# Patient Record
Sex: MALE
[De-identification: names, ages, dates, MRNs, and addresses within clinical notes are randomized per-mention and may not be internally consistent; named-entity substitution may affect disease eponyms.]

---

## 2019-07-16 ENCOUNTER — HOSPITAL ENCOUNTER (INPATIENT)
Dept: HOSPITAL 88 - ER | Age: 79
LOS: 2 days | Discharge: HOME | DRG: 690 | End: 2019-07-18
Attending: INTERNAL MEDICINE | Admitting: INTERNAL MEDICINE
Payer: COMMERCIAL

## 2019-07-16 VITALS — BODY MASS INDEX: 33.74 KG/M2 | WEIGHT: 215 LBS | HEIGHT: 67 IN

## 2019-07-16 VITALS — SYSTOLIC BLOOD PRESSURE: 158 MMHG | DIASTOLIC BLOOD PRESSURE: 79 MMHG

## 2019-07-16 VITALS — DIASTOLIC BLOOD PRESSURE: 78 MMHG | SYSTOLIC BLOOD PRESSURE: 149 MMHG

## 2019-07-16 DIAGNOSIS — N40.1: ICD-10-CM

## 2019-07-16 DIAGNOSIS — N39.498: ICD-10-CM

## 2019-07-16 DIAGNOSIS — Z16.12: ICD-10-CM

## 2019-07-16 DIAGNOSIS — N41.9: ICD-10-CM

## 2019-07-16 DIAGNOSIS — N39.0: Primary | ICD-10-CM

## 2019-07-16 DIAGNOSIS — B96.20: ICD-10-CM

## 2019-07-16 LAB
ALBUMIN SERPL-MCNC: 3.6 G/DL (ref 3.5–5)
ALBUMIN/GLOB SERPL: 0.9 {RATIO} (ref 0.8–2)
ALP SERPL-CCNC: 68 IU/L (ref 40–150)
ALT SERPL-CCNC: 25 IU/L (ref 0–55)
ANION GAP SERPL CALC-SCNC: 12.4 MMOL/L (ref 8–16)
BASOPHILS # BLD AUTO: 0 10*3/UL (ref 0–0.1)
BASOPHILS NFR BLD AUTO: 0.4 % (ref 0–1)
BUN SERPL-MCNC: 13 MG/DL (ref 7–26)
BUN/CREAT SERPL: 11 (ref 6–25)
CALCIUM SERPL-MCNC: 10.1 MG/DL (ref 8.4–10.2)
CHLORIDE SERPL-SCNC: 109 MMOL/L (ref 98–107)
CO2 SERPL-SCNC: 24 MMOL/L (ref 22–29)
DEPRECATED NEUTROPHILS # BLD AUTO: 2.9 10*3/UL (ref 2.1–6.9)
EGFRCR SERPLBLD CKD-EPI 2021: > 60 ML/MIN (ref 60–?)
EOSINOPHIL # BLD AUTO: 0.2 10*3/UL (ref 0–0.4)
EOSINOPHIL NFR BLD AUTO: 3 % (ref 0–6)
ERYTHROCYTE [DISTWIDTH] IN CORD BLOOD: 13.1 % (ref 11.7–14.4)
GLOBULIN PLAS-MCNC: 4.1 G/DL (ref 2.3–3.5)
GLUCOSE SERPLBLD-MCNC: 102 MG/DL (ref 74–118)
HCT VFR BLD AUTO: 36.3 % (ref 38.2–49.6)
HGB BLD-MCNC: 12.3 G/DL (ref 14–18)
LYMPHOCYTES # BLD: 2 10*3/UL (ref 1–3.2)
LYMPHOCYTES NFR BLD AUTO: 36.1 % (ref 18–39.1)
MCH RBC QN AUTO: 29.6 PG (ref 28–32)
MCHC RBC AUTO-ENTMCNC: 33.9 G/DL (ref 31–35)
MCV RBC AUTO: 87.5 FL (ref 81–99)
MONOCYTES # BLD AUTO: 0.5 10*3/UL (ref 0.2–0.8)
MONOCYTES NFR BLD AUTO: 8.1 % (ref 4.4–11.3)
NEUTS SEG NFR BLD AUTO: 52 % (ref 38.7–80)
PLATELET # BLD AUTO: 115 X10E3/UL (ref 140–360)
POTASSIUM SERPL-SCNC: 4.4 MMOL/L (ref 3.5–5.1)
RBC # BLD AUTO: 4.15 X10E6/UL (ref 4.3–5.7)
SODIUM SERPL-SCNC: 141 MMOL/L (ref 136–145)

## 2019-07-16 PROCEDURE — 80053 COMPREHEN METABOLIC PANEL: CPT

## 2019-07-16 PROCEDURE — 36415 COLL VENOUS BLD VENIPUNCTURE: CPT

## 2019-07-16 PROCEDURE — 36569 INSJ PICC 5 YR+ W/O IMAGING: CPT

## 2019-07-16 PROCEDURE — 85025 COMPLETE CBC W/AUTO DIFF WBC: CPT

## 2019-07-16 PROCEDURE — 87040 BLOOD CULTURE FOR BACTERIA: CPT

## 2019-07-16 PROCEDURE — 71045 X-RAY EXAM CHEST 1 VIEW: CPT

## 2019-07-16 PROCEDURE — 82948 REAGENT STRIP/BLOOD GLUCOSE: CPT

## 2019-07-16 PROCEDURE — 99284 EMERGENCY DEPT VISIT MOD MDM: CPT

## 2019-07-16 RX ADMIN — INSULIN HUMAN SCH UNIT: 100 INJECTION, SOLUTION PARENTERAL at 20:32

## 2019-07-16 RX ADMIN — MEROPENEM SCH MLS/HR: 500 INJECTION INTRAVENOUS at 17:34

## 2019-07-16 NOTE — NUR
This 77y/o Chinese speaking male arrived to the unit via wheelchair. The pt. was made 
comfortable and blood glucose was monitored with result of 85. The pt. was provided a 
sandwich and milk as the pt. reports he had no dinner. There are iv caths in both ac spaces 
18g. There is a family member at the bedside. The pt. presents with a diagnosis of UTI. 
Blood cultures have been done with results pending.

## 2019-07-16 NOTE — NUR
Completed nursing assessment. Pt [place] Speaking. Language Line called. Pt alert to name, 
knows he's at the hospital for urinating a lot. Dx: UTI. Denies pain or discomfort at this 
time. Skin warm and intact. Last BM 7/16. Oriented to room. Call bell within reach. Bed low 
and locked. Will continue to monitor.

## 2019-07-17 VITALS — SYSTOLIC BLOOD PRESSURE: 177 MMHG | DIASTOLIC BLOOD PRESSURE: 81 MMHG

## 2019-07-17 VITALS — SYSTOLIC BLOOD PRESSURE: 172 MMHG | DIASTOLIC BLOOD PRESSURE: 74 MMHG

## 2019-07-17 VITALS — SYSTOLIC BLOOD PRESSURE: 161 MMHG | DIASTOLIC BLOOD PRESSURE: 73 MMHG

## 2019-07-17 VITALS — DIASTOLIC BLOOD PRESSURE: 70 MMHG | SYSTOLIC BLOOD PRESSURE: 171 MMHG

## 2019-07-17 VITALS — DIASTOLIC BLOOD PRESSURE: 68 MMHG | SYSTOLIC BLOOD PRESSURE: 151 MMHG

## 2019-07-17 VITALS — SYSTOLIC BLOOD PRESSURE: 146 MMHG | DIASTOLIC BLOOD PRESSURE: 66 MMHG

## 2019-07-17 VITALS — SYSTOLIC BLOOD PRESSURE: 171 MMHG | DIASTOLIC BLOOD PRESSURE: 70 MMHG

## 2019-07-17 PROCEDURE — 02HV33Z INSERTION OF INFUSION DEVICE INTO SUPERIOR VENA CAVA, PERCUTANEOUS APPROACH: ICD-10-PCS

## 2019-07-17 RX ADMIN — DOXAZOSIN MESYLATE SCH MG: 2 TABLET ORAL at 11:19

## 2019-07-17 RX ADMIN — MEROPENEM SCH MLS/HR: 500 INJECTION INTRAVENOUS at 17:42

## 2019-07-17 RX ADMIN — INSULIN HUMAN SCH UNIT: 100 INJECTION, SOLUTION PARENTERAL at 11:30

## 2019-07-17 RX ADMIN — INSULIN HUMAN SCH UNIT: 100 INJECTION, SOLUTION PARENTERAL at 20:36

## 2019-07-17 RX ADMIN — METFORMIN HYDROCHLORIDE SCH MG: 850 TABLET, FILM COATED ORAL at 17:42

## 2019-07-17 RX ADMIN — INSULIN HUMAN SCH UNIT: 100 INJECTION, SOLUTION PARENTERAL at 16:30

## 2019-07-17 RX ADMIN — METFORMIN HYDROCHLORIDE SCH MG: 850 TABLET, FILM COATED ORAL at 11:19

## 2019-07-17 RX ADMIN — LISINOPRIL SCH MG: 20 TABLET ORAL at 11:18

## 2019-07-17 RX ADMIN — MEROPENEM SCH MLS/HR: 500 INJECTION INTRAVENOUS at 05:16

## 2019-07-17 RX ADMIN — INSULIN HUMAN SCH UNIT: 100 INJECTION, SOLUTION PARENTERAL at 07:30

## 2019-07-17 NOTE — NUR
CONSENT FOR PICC LINE SIGNED, RADIOLOGY NOTIFIED. PATIENT IN BED TALKING TO FAMILY MEMBERS 
VISITING. CALL LIGHT AT REACH.

## 2019-07-17 NOTE — NUR
MD IN TO SEE PATIENT, HOME MEDICATIONS RENEWED A. IN BED RESTING WITH NO DISTRESS. CALL 
LIGHT AT REACH.

## 2019-07-17 NOTE — CONSULTATION
DATE OF CONSULTATION:    

 

REASON FOR CONSULTATION:  UTI with multidrug resistant.

 

HISTORY OF PRESENT ILLNESS:  This patient who apparently has been followed by Dr. Livan Lyles, urologist as an outpatient.  Urine culture was done recently showed he had a

multidrug resistant pathogen.  The patient has been having fever, chills, incontinence.

The patient does have history of UTI, history of benign prostatic hypertrophy,

hypertension, diabetes mellitus, neuropathy, hyperlipidemia. 

 

PAST SURGICAL HISTORY:  Denies.

 

ALLERGIES:  NKA.

 

SOCIAL HISTORY:  There is no smoking, drug abuse, or alcohol abuse.  He used to smoke,

but he quit in 2012. 

 

REVIEW OF SYSTEMS:

GENERAL:  At the present time, the patient is lying in bed, comfortable. 

HEENT:  There is no headache, visual changes, or hearing changes. 

GI:  There is no nausea, no vomiting, no diarrhea. 

CARDIAC:  There is no arrhythmia. 

NEUROLOGIC:  No seizure activity. 

SKIN:  There are no other rashes. 

 

Apparently, he had fever before he came here.  The patient denies that he has continued

to have fever.  The patient was admitted.  Blood cultures obtained. 

 

LABORATORY DATA:  White count is 5.6, hemoglobin 12.  His sodium 141, potassium 4.4,

creatinine 1.17.  He was started on metformin, Cardura, Prinivil, meropenem.  His

laboratory data reviewed.  White count is 5.65, hemoglobin 12.3, his platelets 115.  I

do not have cultures here, but according to Dr. Livan Lyles, he had grew a bacteria,

which was resistant, sensitive only to meropenem. 

 

PHYSICAL EXAMINATION:

GENERAL:  He is currently alert, oriented, does not seem to be in acute distress. 

VITAL SIGNS:  Stable.  Currently afebrile. 

HEENT:  He is not icteric. 

NECK:  Supple. 

CHEST:  Clear. 

HEART:  S1, S2.  No S3, S4, or murmur. 

ABDOMEN:  Soft.  Bowel sounds present.  No tenderness. 

EXTREMITIES:  No edema.

IMPRESSION:  Urinary tract infection with multidrug resistant pathogen.  We will get a

copy of Dr. Livan Lyles's notes.  PICC line is inserted.  Agree with meropenem as ordered

500 IV q.8. 

Further recommendations to follow.  The patient can be discharged home once IV

antibiotics are arranged. 

 

 

 

 

______________________________

MD ADENIKE Doty/FRANCIS

D:  07/17/2019 15:37:48

T:  07/17/2019 19:47:48

Job #:  [id number]

## 2019-07-17 NOTE — NUR
ORDERS FOR HOME HEALTH FOR [facility] 1GM IV Q 12 HRS X 10 DAYS

CHOICE LETTER SIGNED BY PT'S WIFE FOR [facility] (IN [facility]WORK FOR [place] [employer]) [phone]

NADIR SPOKE WITH LIZETH AT [facility] [phone]

FAXED CLINICALS -[phone]; CONFIRMATION REC'D

HOME HEALTH AND IV ABX TO BE ARRANGED BY [facility] AFTER ARRANGEMENTS MADE

## 2019-07-17 NOTE — NUR
Completed bedside nursing report with morning nurse. Pt alert and orient to name. Croatian 
Speaking. Lying in bed HOB 45 degrees. Denies pain at this time. Family at bedside. Call 
bell within reach. Will continue to monitor.

## 2019-07-17 NOTE — NUR
PATIENT IN BED RESTING WITH NOS/S OF DISTRESS. DENIED PAIN AT THIS TIME. BED IN LOWER 
POSITION, CALL LIGHT AT REACH. FAMILY AT BED SIDE.

## 2019-07-17 NOTE — DIAGNOSTIC IMAGING REPORT
A single frontal view of the chest.



HISTORY:  UTI, PICC line placement

COMPARISON:  None available.

     

DISCUSSION:

Portable technique,  limits sensitivity of the exam.

Soft tissue attenuation partially limits sensitivity of the exam.

Tubes/Lines: Right upper extremity PICC line, the tip projects at the region of

the midsuperior vena cava..



Lungs and pleura:  

Low lung volumes result in bibasilar vascular crowding, accentuation of the

pulmonary interstitial markings, central pulmonary vasculature, and the cardiac

silhouette.  Allowing for these limitations, the findings are as follows:  

Left basilar atelectasis.

Confluent opacity partially obscures evaluation of the left lung base, this may

be secondary to the soft tissue attenuation or a small effusion.



Heart and mediastinum:  

The cardiac silhouette appears at the upper limits of normal.



Bones and soft tissues:  

Appear unremarkable, given this limited exam.





IMPRESSION: 

1.  Status post placement of a right upper PICC line.

2.  Left basilar atelectasis with the possibility of a small pleural effusion. 

Recommend short term follow up routine PA and lateral chest radiographs, in 6

to 8 weeks, to evaluate for resolution.













Signed by: Dr. Dwight Justin D.O., M.M.M. on 7/17/2019 9:33 PM

## 2019-07-18 VITALS — SYSTOLIC BLOOD PRESSURE: 147 MMHG | DIASTOLIC BLOOD PRESSURE: 68 MMHG

## 2019-07-18 VITALS — DIASTOLIC BLOOD PRESSURE: 74 MMHG | SYSTOLIC BLOOD PRESSURE: 150 MMHG

## 2019-07-18 VITALS — SYSTOLIC BLOOD PRESSURE: 158 MMHG | DIASTOLIC BLOOD PRESSURE: 69 MMHG

## 2019-07-18 VITALS — DIASTOLIC BLOOD PRESSURE: 64 MMHG | SYSTOLIC BLOOD PRESSURE: 153 MMHG

## 2019-07-18 VITALS — SYSTOLIC BLOOD PRESSURE: 154 MMHG | DIASTOLIC BLOOD PRESSURE: 67 MMHG

## 2019-07-18 RX ADMIN — MEROPENEM SCH MLS/HR: 500 INJECTION INTRAVENOUS at 04:44

## 2019-07-18 RX ADMIN — METFORMIN HYDROCHLORIDE SCH MG: 850 TABLET, FILM COATED ORAL at 17:23

## 2019-07-18 RX ADMIN — MEROPENEM SCH MLS/HR: 500 INJECTION INTRAVENOUS at 17:23

## 2019-07-18 RX ADMIN — INSULIN HUMAN SCH UNIT: 100 INJECTION, SOLUTION PARENTERAL at 16:30

## 2019-07-18 RX ADMIN — METFORMIN HYDROCHLORIDE SCH MG: 850 TABLET, FILM COATED ORAL at 08:33

## 2019-07-18 RX ADMIN — INSULIN HUMAN SCH UNIT: 100 INJECTION, SOLUTION PARENTERAL at 07:30

## 2019-07-18 RX ADMIN — DOXAZOSIN MESYLATE SCH MG: 2 TABLET ORAL at 09:07

## 2019-07-18 RX ADMIN — INSULIN HUMAN SCH UNIT: 100 INJECTION, SOLUTION PARENTERAL at 11:30

## 2019-07-18 RX ADMIN — LISINOPRIL SCH MG: 20 TABLET ORAL at 09:07

## 2019-07-18 NOTE — NUR
PATIENT DISCHARGED HOME. DISCHARGE INSTRUCTIONS, PRESCRIPTION, AND FOLLOW UP GIVEN TO 
PATIENT AND DAUGHTER,  THEY VERBALIZED UNDERSTANDING.  RIGHT UPPER ARM PICC LINE INTACT AND 
PATENT. ALL PERSONAL ITEMS TAKEN WITH PATIENT. REFUSED WHEEL CHAIR, BUT WAS ACCOMPANIED BY 
HOSPITAL STAFF TO FRONT LOBBY IN STABLE CONDITION.

## 2019-07-18 NOTE — NUR
HOME IV ANTIBIOTIC APPROVED, AWAITING TO SETTING AS PER . PATIENT TO RECEIVE THE 
EVENING DOSE OF ANTIBIOTIC BEFORE DISCHARGE.

## 2019-07-18 NOTE — NUR
PATIENT IN BED RESTING WITH EYES CLOSED, NO DISTRESS NOTED. BED IN LOWER POSITION AND 
LOCKED. CALL LIGHT AT REACH.

## 2019-07-18 NOTE — DISCHARGE SUMMARY
CONSULTANTS:  

1. Dr. Bernardo Tan.

2. Dr. Livan Lyles.

 

FINAL DIAGNOSES:  

1. Complicated urinary tract infection associated with ESBL only sensitive to meropenem.

2. Baseline enlarged prostate, urinary incontinent prostatitis.

 

SUMMARY:  The patient is a 78-year-old male, failed outpatient treatment for prostatitis

and urinary tract infection.  Urine culture grew out to be ESBL, the patient is only

sensitive to meropenem.  The patient admitted to the hospital.  He started on meropenem.

 The patient is now arranged for meropenem for approximately 10 days.  Arrangement has

been made.  The patient was discharged home today.  He will continue with meropenem at

home.  Arrangement for home 

health.  The patient to follow up with Dr. Livan Lyles and Dr. Tan as an outpatient

for management of his IV antibiotics.  The patient has right upper extremity PICC line. 

 

 

 

 

______________________________

MD PREETHI Johnson/FRANCIS

D:  07/18/2019 10:21:35

T:  07/18/2019 15:16:51

Job #:  [id number]

## 2019-07-18 NOTE — NUR
SPOKE WITH LIZETH AT [facility] [phone]

PT APPROVED THRU INSURANCE FOR IV ABX

THEY HAVE ARRANGED [facility] TO FOLLOW PT

PICC LINE CONFIRMATION FAXED TO LIZETH AND CONFIRMATION REC'D

PLAN DC HOME AFTER 5 PM ABX DOSE TODAY